# Patient Record
Sex: FEMALE | Race: WHITE | NOT HISPANIC OR LATINO | URBAN - NONMETROPOLITAN AREA
[De-identification: names, ages, dates, MRNs, and addresses within clinical notes are randomized per-mention and may not be internally consistent; named-entity substitution may affect disease eponyms.]

---

## 2024-05-07 ENCOUNTER — OFFICE (OUTPATIENT)
Dept: URBAN - NONMETROPOLITAN AREA CLINIC 5 | Facility: CLINIC | Age: 69
End: 2024-05-07

## 2024-05-07 VITALS
HEIGHT: 62 IN | RESPIRATION RATE: 18 BRPM | SYSTOLIC BLOOD PRESSURE: 137 MMHG | HEART RATE: 76 BPM | WEIGHT: 162 LBS | DIASTOLIC BLOOD PRESSURE: 70 MMHG

## 2024-05-07 DIAGNOSIS — K75.0 ABSCESS OF LIVER: ICD-10-CM

## 2024-05-07 PROCEDURE — 99204 OFFICE O/P NEW MOD 45 MIN: CPT | Performed by: INTERNAL MEDICINE

## 2024-05-07 NOTE — SERVICENOTES
Patient had a repeat CT scan after completion of antibiotic therapy which revealed multiple fluid collections measuring 4.7 x 5 cm, 3 cm x 2 cm, and 1.1 x 1.8 cm.  These all had significantly decreased in size from prior.  She has since been off antibiotics.  Denies any fevers, chills, night sweats.  She does report ongoing fatigue.  She has never had a drain placed in these.  She has not feel comfortable pursuing care at [facility].   Will plan to repeat blood cultures blood work at this time.  Would consider Infectious Disease recommendations for antibiotic time course.

## 2024-05-07 NOTE — SERVICEHPINOTES
Kassandra Tamayo   is a   67 yo  female  with no significant past medical history who was admitted to the hospital on 04/16 complaining of right flank pain, dysuria, fevers, and chills over the prior 7 days. She had been treated with a course of Cipro for urinary tract infection but had increasing flank pain, nausea, and lower back pain. In the ED she was noted to have white blood cell 21.5, hemoglobin 10.9, platelets 509, INR 1.3. She completed a subsequent CT abdomen/pelvis that noted “there is a 10.8 cm solid/cystic mass right hepatic lobe. The appearance is nonspecific. Consider primary liver neoplasm or abscess. The lesion would be amenable to CT-guided needle biopsy pathologic correlation is needed. Small hiatal hernia. ” Patient did undergo CT-guided liver biopsy on 04/17. The pathology from this resulted as an abscess. There was not enough material for culture and sensitivity. Patient was ultimately started on cefdinir and metronidazole. She was placed on this for a 2 week course with plan follow-up CT scan. Her white blood cell count and liver enzymes began to downtrend and she was discharged from the hospital. She completed a repeat CT abdomen pelvis on 05/03/2024 which noted “heterogeneous perfusion right lobe of the liver with cystic fluid collections compatible with underlying infection/abscess from previous biopsy results. Largest fluid collection in the right lobe measures approximately 4.7 x 5 cm in size. Additional smaller adjacent fluid collection in the right lobe of the liver also compatible with an abscess now measures 3 x 2.0 cm, additional abscess collection right lobe of liver now measuring 1.1 x 1.8 cm. ” Patient presents now in clinic to establish care. She denies any history of liver disease. She does report having dental work over the last year with a redo of a crown as well as additional filling. She has never had a colonoscopy. Her bile ducts and gallbladder appear unremarkable on prior imaging. Patient was accompanied by her  today. She has now been off the antibiotics for over week. She denies any fevers, chills, night sweats, nausea, vomiting. She was not discharged with IV antibiotics. She has not had any recent blood work her blood cultures.